# Patient Record
Sex: MALE | Race: WHITE | NOT HISPANIC OR LATINO | URBAN - METROPOLITAN AREA
[De-identification: names, ages, dates, MRNs, and addresses within clinical notes are randomized per-mention and may not be internally consistent; named-entity substitution may affect disease eponyms.]

---

## 2022-03-23 ENCOUNTER — APPOINTMENT (RX ONLY)
Dept: URBAN - METROPOLITAN AREA CLINIC 73 | Facility: CLINIC | Age: 62
Setting detail: DERMATOLOGY
End: 2022-03-23

## 2022-03-23 DIAGNOSIS — B07.8 OTHER VIRAL WARTS: ICD-10-CM | Status: INADEQUATELY CONTROLLED

## 2022-03-23 DIAGNOSIS — Z71.89 OTHER SPECIFIED COUNSELING: ICD-10-CM

## 2022-03-23 PROCEDURE — 17110 DESTRUCTION B9 LES UP TO 14: CPT

## 2022-03-23 PROCEDURE — 99202 OFFICE O/P NEW SF 15 MIN: CPT | Mod: 25

## 2022-03-23 PROCEDURE — ? PRESCRIPTION

## 2022-03-23 PROCEDURE — ? COUNSELING

## 2022-03-23 PROCEDURE — ? TREATMENT REGIMEN

## 2022-03-23 PROCEDURE — ? LIQUID NITROGEN

## 2022-03-23 RX ORDER — PHARMACY COMPOUNDING ACCESSORY
EACH MISCELLANEOUS AS DIRECTED
Qty: 1 | Refills: 1 | Status: ERX | COMMUNITY
Start: 2022-03-23

## 2022-03-23 RX ADMIN — Medication: at 00:00

## 2022-03-23 ASSESSMENT — LOCATION ZONE DERM: LOCATION ZONE: FINGER

## 2022-03-23 ASSESSMENT — LOCATION SIMPLE DESCRIPTION DERM: LOCATION SIMPLE: LEFT INDEX FINGER

## 2022-03-23 ASSESSMENT — LOCATION DETAILED DESCRIPTION DERM: LOCATION DETAILED: LEFT PROXIMAL PALMAR INDEX FINGER

## 2022-03-23 NOTE — PROCEDURE: LIQUID NITROGEN
Render Post-Care Instructions In Note?: no
Number Of Freeze-Thaw Cycles: 2 freeze-thaw cycles
Show Aperture Variable?: Yes
Application Tool (Optional): Cotton Tipped Applicator
Medical Necessity Information: It is in your best interest to select a reason for this procedure from the list below. All of these items fulfill various CMS LCD requirements except the new and changing color options.
Post-Care Instructions: I reviewed with the patient in detail post-care instructions. Patient is to wear sunprotection, and avoid picking at any of the treated lesions. Pt may apply Vaseline to crusted or scabbing areas.
Duration Of Freeze Thaw-Cycle (Seconds): 6
Medical Necessity Clause: This procedure was medically necessary because the lesions that were treated were:
Spray Paint Text: The liquid nitrogen was applied to the skin utilizing a spray paint frosting technique.
Consent: The patient's consent was obtained including but not limited to risks of crusting, scabbing, blistering, scarring, darker or lighter pigmentary change, recurrence, incomplete removal and infection.
Detail Level: Simple

## 2022-03-23 NOTE — HPI: EVALUATION OF SKIN LESION(S)
What Type Of Note Output Would You Prefer (Optional)?: Standard Output
Hpi Title: Evaluation of a Skin Lesion
How Severe Are Your Spot(S)?: mild
Additional History: Lesion treated with liquid nitrogen by pcp.

## 2022-08-24 ENCOUNTER — APPOINTMENT (RX ONLY)
Dept: URBAN - METROPOLITAN AREA CLINIC 73 | Facility: CLINIC | Age: 62
Setting detail: DERMATOLOGY
End: 2022-08-24

## 2022-08-24 DIAGNOSIS — Z71.89 OTHER SPECIFIED COUNSELING: ICD-10-CM

## 2022-08-24 DIAGNOSIS — L28.0 LICHEN SIMPLEX CHRONICUS: ICD-10-CM

## 2022-08-24 DIAGNOSIS — L94.0 LOCALIZED SCLERODERMA [MORPHEA]: ICD-10-CM

## 2022-08-24 PROCEDURE — ? COUNSELING

## 2022-08-24 PROCEDURE — ? PRESCRIPTION

## 2022-08-24 PROCEDURE — 99214 OFFICE O/P EST MOD 30 MIN: CPT

## 2022-08-24 PROCEDURE — ? TREATMENT REGIMEN

## 2022-08-24 RX ORDER — TRIAMCINOLONE ACETONIDE 1 MG/G
CREAM TOPICAL BID
Qty: 45 | Refills: 2 | Status: ERX | COMMUNITY
Start: 2022-08-24

## 2022-08-24 RX ADMIN — TRIAMCINOLONE ACETONIDE: 1 CREAM TOPICAL at 00:00

## 2022-08-24 ASSESSMENT — LOCATION ZONE DERM
LOCATION ZONE: LEG
LOCATION ZONE: ARM

## 2022-08-24 ASSESSMENT — LOCATION DETAILED DESCRIPTION DERM
LOCATION DETAILED: LEFT DISTAL POSTERIOR UPPER ARM
LOCATION DETAILED: RIGHT DISTAL PRETIBIAL REGION

## 2022-08-24 ASSESSMENT — LOCATION SIMPLE DESCRIPTION DERM
LOCATION SIMPLE: RIGHT PRETIBIAL REGION
LOCATION SIMPLE: LEFT UPPER ARM

## 2022-08-24 NOTE — PROCEDURE: TREATMENT REGIMEN
Initiate Treatment: Triamcinolone BID x 2 weeks
Detail Level: Zone
Plan: If not improved at follow up, will opt for biopsy

## 2022-10-03 ENCOUNTER — APPOINTMENT (RX ONLY)
Dept: URBAN - METROPOLITAN AREA CLINIC 73 | Facility: CLINIC | Age: 62
Setting detail: DERMATOLOGY
End: 2022-10-03

## 2022-10-03 DIAGNOSIS — Z71.89 OTHER SPECIFIED COUNSELING: ICD-10-CM

## 2022-10-03 DIAGNOSIS — R21 RASH AND OTHER NONSPECIFIC SKIN ERUPTION: ICD-10-CM

## 2022-10-03 PROCEDURE — ? BIOPSY BY PUNCH METHOD

## 2022-10-03 PROCEDURE — 11104 PUNCH BX SKIN SINGLE LESION: CPT

## 2022-10-03 PROCEDURE — ? COUNSELING

## 2022-10-03 PROCEDURE — 99212 OFFICE O/P EST SF 10 MIN: CPT | Mod: 25

## 2022-10-03 ASSESSMENT — LOCATION DETAILED DESCRIPTION DERM: LOCATION DETAILED: RIGHT LATERAL DISTAL PRETIBIAL REGION

## 2022-10-03 ASSESSMENT — LOCATION SIMPLE DESCRIPTION DERM: LOCATION SIMPLE: RIGHT PRETIBIAL REGION

## 2022-10-03 ASSESSMENT — LOCATION ZONE DERM: LOCATION ZONE: LEG

## 2022-10-18 ENCOUNTER — APPOINTMENT (RX ONLY)
Dept: URBAN - METROPOLITAN AREA CLINIC 73 | Facility: CLINIC | Age: 62
Setting detail: DERMATOLOGY
End: 2022-10-18

## 2022-10-18 DIAGNOSIS — L94.0 LOCALIZED SCLERODERMA [MORPHEA]: ICD-10-CM

## 2022-10-18 DIAGNOSIS — Z71.89 OTHER SPECIFIED COUNSELING: ICD-10-CM

## 2022-10-18 PROCEDURE — ? COUNSELING

## 2022-10-18 PROCEDURE — 99213 OFFICE O/P EST LOW 20 MIN: CPT

## 2022-10-18 PROCEDURE — ? PRESCRIPTION

## 2022-10-18 PROCEDURE — ? TREATMENT REGIMEN

## 2022-10-18 RX ORDER — TACROLIMUS 1 MG/G
1 OINTMENT TOPICAL BID
Qty: 60 | Refills: 2 | Status: ERX | COMMUNITY
Start: 2022-10-18

## 2022-10-18 RX ADMIN — TACROLIMUS 1: 1 OINTMENT TOPICAL at 00:00

## 2022-10-18 NOTE — PROCEDURE: TREATMENT REGIMEN
Detail Level: Zone
Initiate Treatment: Protopic once or twice daily.
Plan: Discussed Morphea can be persistent, so it will take some time to improve

## 2023-01-24 ENCOUNTER — APPOINTMENT (RX ONLY)
Dept: URBAN - METROPOLITAN AREA CLINIC 73 | Facility: CLINIC | Age: 63
Setting detail: DERMATOLOGY
End: 2023-01-24

## 2023-01-24 DIAGNOSIS — Z71.89 OTHER SPECIFIED COUNSELING: ICD-10-CM

## 2023-01-24 DIAGNOSIS — L94.0 LOCALIZED SCLERODERMA [MORPHEA]: ICD-10-CM

## 2023-01-24 PROCEDURE — 99213 OFFICE O/P EST LOW 20 MIN: CPT

## 2023-01-24 PROCEDURE — ? TREATMENT REGIMEN

## 2023-01-24 PROCEDURE — ? COUNSELING

## 2023-01-24 NOTE — PROCEDURE: TREATMENT REGIMEN
Detail Level: Zone
Continue Regimen: Protopic once or twice daily
Otc Regimen: Moisturize to help with dryness
Plan: Discussed Morphea can be persistent, so it will take some time to improve

## 2023-07-18 ENCOUNTER — APPOINTMENT (RX ONLY)
Dept: URBAN - METROPOLITAN AREA CLINIC 73 | Facility: CLINIC | Age: 63
Setting detail: DERMATOLOGY
End: 2023-07-18

## 2023-07-18 DIAGNOSIS — Z71.89 OTHER SPECIFIED COUNSELING: ICD-10-CM

## 2023-07-18 DIAGNOSIS — L94.0 LOCALIZED SCLERODERMA [MORPHEA]: ICD-10-CM

## 2023-07-18 PROCEDURE — ? TREATMENT REGIMEN

## 2023-07-18 PROCEDURE — ? COUNSELING

## 2023-07-18 PROCEDURE — 99213 OFFICE O/P EST LOW 20 MIN: CPT

## 2023-07-18 NOTE — PROCEDURE: TREATMENT REGIMEN
Detail Level: Zone
Continue Regimen: Protopic can use a few times a week
Otc Regimen: Moisturize to help with dryness
Plan: Patient is doing well. Not bothersome to patient. Explained he can decrease use of protopic to a few times a week for maintanence. He could also trial of stopping the medication and treating if becomes flared. Discussed Morphea can be persistent, so it will take some time to improve.

## 2024-07-23 ENCOUNTER — APPOINTMENT (RX ONLY)
Dept: URBAN - METROPOLITAN AREA CLINIC 72 | Facility: CLINIC | Age: 64
Setting detail: DERMATOLOGY
End: 2024-07-23

## 2024-07-23 DIAGNOSIS — L94.0 LOCALIZED SCLERODERMA [MORPHEA]: ICD-10-CM

## 2024-07-23 DIAGNOSIS — Z71.89 OTHER SPECIFIED COUNSELING: ICD-10-CM

## 2024-07-23 PROCEDURE — 99213 OFFICE O/P EST LOW 20 MIN: CPT

## 2024-07-23 PROCEDURE — ? COUNSELING

## 2024-07-23 PROCEDURE — ? TREATMENT REGIMEN

## 2025-07-22 ENCOUNTER — APPOINTMENT (OUTPATIENT)
Dept: URBAN - METROPOLITAN AREA CLINIC 72 | Facility: CLINIC | Age: 65
Setting detail: DERMATOLOGY
End: 2025-07-22

## 2025-07-22 DIAGNOSIS — Z71.89 OTHER SPECIFIED COUNSELING: ICD-10-CM

## 2025-07-22 DIAGNOSIS — L94.0 LOCALIZED SCLERODERMA [MORPHEA]: ICD-10-CM

## 2025-07-22 PROCEDURE — ? TREATMENT REGIMEN

## 2025-07-22 PROCEDURE — ? COUNSELING
